# Patient Record
Sex: MALE | Race: WHITE | URBAN - METROPOLITAN AREA
[De-identification: names, ages, dates, MRNs, and addresses within clinical notes are randomized per-mention and may not be internally consistent; named-entity substitution may affect disease eponyms.]

---

## 2021-04-26 ENCOUNTER — OFFICE VISIT (OUTPATIENT)
Dept: ENDOCRINOLOGY | Age: 46
End: 2021-04-26
Payer: COMMERCIAL

## 2021-04-26 VITALS
RESPIRATION RATE: 18 BRPM | OXYGEN SATURATION: 97 % | BODY MASS INDEX: 20.89 KG/M2 | WEIGHT: 162.8 LBS | HEIGHT: 74 IN | DIASTOLIC BLOOD PRESSURE: 93 MMHG | HEART RATE: 96 BPM | SYSTOLIC BLOOD PRESSURE: 142 MMHG

## 2021-04-26 DIAGNOSIS — Z96.41 INSULIN PUMP IN PLACE: ICD-10-CM

## 2021-04-26 DIAGNOSIS — E10.65 UNCONTROLLED TYPE 1 DIABETES MELLITUS WITH HYPERGLYCEMIA (HCC): Primary | ICD-10-CM

## 2021-04-26 LAB — HBA1C MFR BLD: 7.1 %

## 2021-04-26 PROCEDURE — 3051F HG A1C>EQUAL 7.0%<8.0%: CPT | Performed by: INTERNAL MEDICINE

## 2021-04-26 PROCEDURE — 99204 OFFICE O/P NEW MOD 45 MIN: CPT | Performed by: INTERNAL MEDICINE

## 2021-04-26 PROCEDURE — 83036 HEMOGLOBIN GLYCOSYLATED A1C: CPT | Performed by: INTERNAL MEDICINE

## 2021-04-26 PROCEDURE — 95251 CONT GLUC MNTR ANALYSIS I&R: CPT | Performed by: INTERNAL MEDICINE

## 2021-04-26 RX ORDER — ATORVASTATIN CALCIUM 10 MG/1
TABLET, FILM COATED ORAL
COMMUNITY
Start: 2021-03-24

## 2021-04-26 RX ORDER — ENALAPRIL MALEATE 20 MG/1
TABLET ORAL
COMMUNITY
Start: 2021-02-01

## 2021-04-26 SDOH — HEALTH STABILITY: MENTAL HEALTH: HOW OFTEN DO YOU HAVE A DRINK CONTAINING ALCOHOL?: NEVER

## 2021-04-26 NOTE — PROGRESS NOTES
Seen as new patient for diabetes    Diagnosed with Type 1 diabetes mellitus in  1985    Known diabetic complications: retinopathy ( macular edema)  Uncontrolled, moderate    Current diabetic medications     He is on medtronic pump-  On it for 14-16 years     MN 1.10  3:30 1.5  7:30 0.750  13:30 0.550    I:C  MN 13  5 pm 11    CF 60    Target     He has had replacement pump as had failures    Last A1c  7.1%<----- 7.3%<--- 8%<---- 8.5%<---- 9.3%    Prior visit with dietician: Yes   Current diet: on average, 3 meals per day   Current exercise: walking   Current monitoring regimen: home blood tests -   Using CGM    Has brought blood glucose log/meter:   Home blood sugar records:  Download:  Average sensor 149  automode 83 %      Any episodes of hypoglycemia? Worsened by high CHO    No Hx of CAD , PVD, CVA    Hyperlipidemia:   LDL 79 on 3.21    On lipitor for prevention    Last eye exam: 1/21  Last foot exam: 4/21  Last microalbumin to creatinine ratio: 3/21    He is on vasotec 20mg for prevention    No past medical history on file. No past surgical history on file. No current outpatient medications on file. No current facility-administered medications for this visit. Current Outpatient Medications   Medication Sig Dispense Refill    NOVOLOG 100 UNIT/ML injection vial INJECT 50 UNITS DAILY WITH INSULIN PUMP      enalapril (VASOTEC) 20 MG tablet TAKE 1 TABLET BY MOUTH EVERY DAY      atorvastatin (LIPITOR) 10 MG tablet        No current facility-administered medications for this visit.         SH: former smoker    Review of Systems  Please see scanned document dated and signed      Objective:     Vitals:    04/26/21 1619   BP: (!) 142/93   Pulse: 96   Resp: 18   SpO2: 97%       Constitutional: Well-developed, alert, appears stated age, cooperative, in no acute distress  H/E/N/M/T:atraumatic, normocephalic, external ears, nose, lips normal without lesions  No facial puffiness, no hoarseness Eyes: Arcus Senilis is not present, extraocular muscles are intact  Neck: supple, trachea midline, acanthosis nigricance is not present. Thyroid: gland size is normal, non-tender on palpation  Respiratory: breathing is unlabored, lungs are clear to auscultations. Cardiovascular: regular rate and rhythm, S1, S2, regular rate and rhythm, no murmur, rub or gallop. Skeletal muscular: no kyphosis, no gross abnormalities  Skin: Xanthoma/Xanthelasmas are  not present  Psychiatric: Judgement and Insight:  judgement and insight appear normal  Neuro: Normal without focal findings, speech is spontaneous, and movements are coordinated, alert and oriented x3   Skeletal foot exam is normal, no skin lesions, toenails are normal, pulses are normal, 10 g monofilament is     Lab Reviewed   No components found for: CHLPL  No results found for: TRIG  No results found for: HDL  No results found for: LDLCALC  No results found for: LABVLDL  No results found for: LABA1C    Assessment:     Trung Huynh is a 54 y.o. male with :    1.T1DM: Fairly controlled, brittle diabetes, with glucose variation. Reviewed download , he has been placing less CHO with lunch due to post meal lows, will adjust I:C. He does have post supper high, may decrease the I:C  He has lows with activity, advised temporary basal when active. He has some bubbles in reservoir, will have Rep assess. Pump will be out of warranty by next visit and will like to discuss other pump options. 2.Insulin pump in place: On 12 G with automode       Plan:      MN 1.10  3:30 1.5  7:30 0.750  13:30 0.550    I:C  MN 13  11 am 13---> 16  5 pm 11    CF 60    Target      Advised to check blood sugar 4 times a day   Patient to send blood sugar log for titration. Advise to low simple carbohydrate and protein with each  meal diet. Diabetes Care: recommend yearly eye exam, foot exam and urine microalbumin to   creatinine ratio.

## 2021-07-27 ENCOUNTER — OFFICE VISIT (OUTPATIENT)
Dept: ENDOCRINOLOGY | Age: 46
End: 2021-07-27
Payer: COMMERCIAL

## 2021-07-27 VITALS
WEIGHT: 158 LBS | HEART RATE: 78 BPM | SYSTOLIC BLOOD PRESSURE: 120 MMHG | OXYGEN SATURATION: 98 % | DIASTOLIC BLOOD PRESSURE: 78 MMHG | BODY MASS INDEX: 20.28 KG/M2 | HEIGHT: 74 IN

## 2021-07-27 DIAGNOSIS — E10.65 UNCONTROLLED TYPE 1 DIABETES MELLITUS WITH HYPERGLYCEMIA (HCC): Primary | ICD-10-CM

## 2021-07-27 LAB — HBA1C MFR BLD: 7.1 %

## 2021-07-27 PROCEDURE — 99214 OFFICE O/P EST MOD 30 MIN: CPT | Performed by: INTERNAL MEDICINE

## 2021-07-27 PROCEDURE — 83036 HEMOGLOBIN GLYCOSYLATED A1C: CPT | Performed by: INTERNAL MEDICINE

## 2021-07-27 PROCEDURE — 3051F HG A1C>EQUAL 7.0%<8.0%: CPT | Performed by: INTERNAL MEDICINE

## 2021-07-27 PROCEDURE — 95251 CONT GLUC MNTR ANALYSIS I&R: CPT | Performed by: INTERNAL MEDICINE

## 2021-07-27 RX ORDER — ASPIRIN 81 MG
1 TABLET, DELAYED RELEASE (ENTERIC COATED) ORAL DAILY
COMMUNITY

## 2021-07-27 NOTE — LETTER
[facility]) [facility]  [address]  Phone: [phone]  Fax: [phone]    Ronan Osorio MD        July 27, 2021     Patient: Jyotsna Collier   YOB: 1975   Date of Visit: 7/27/2021       To Whom It May Concern:     I saw Mr. Sweta Garcia in clinic today for DM. If you have any questions or concerns, please don't hesitate to call.     Sincerely,          Ronan Osorio MD

## 2021-11-15 ENCOUNTER — TELEPHONE (OUTPATIENT)
Dept: ENDOCRINOLOGY | Age: 46
End: 2021-11-15

## 2021-11-15 NOTE — TELEPHONE ENCOUNTER
[facility] called stating they need office notes request that was faxed on 11/09/2021 and needs this completed asap. Was this received.      Call back [phone]

## 2021-11-22 ENCOUNTER — OFFICE VISIT (OUTPATIENT)
Dept: ENDOCRINOLOGY | Age: 46
End: 2021-11-22
Payer: COMMERCIAL

## 2021-11-22 VITALS
SYSTOLIC BLOOD PRESSURE: 136 MMHG | HEART RATE: 89 BPM | WEIGHT: 161.4 LBS | DIASTOLIC BLOOD PRESSURE: 84 MMHG | BODY MASS INDEX: 21.39 KG/M2 | HEIGHT: 73 IN

## 2021-11-22 DIAGNOSIS — E10.9 DIABETES MELLITUS TYPE 1, CONTROLLED, WITHOUT COMPLICATIONS (HCC): Primary | ICD-10-CM

## 2021-11-22 DIAGNOSIS — Z96.41 INSULIN PUMP IN PLACE: ICD-10-CM

## 2021-11-22 PROCEDURE — 95251 CONT GLUC MNTR ANALYSIS I&R: CPT | Performed by: INTERNAL MEDICINE

## 2021-11-22 PROCEDURE — 99214 OFFICE O/P EST MOD 30 MIN: CPT | Performed by: INTERNAL MEDICINE

## 2021-11-22 PROCEDURE — 3051F HG A1C>EQUAL 7.0%<8.0%: CPT | Performed by: INTERNAL MEDICINE

## 2021-11-22 NOTE — PROGRESS NOTES
(VASOTEC) 20 MG tablet TAKE 1 TABLET BY MOUTH EVERY DAY      atorvastatin (LIPITOR) 10 MG tablet        No current facility-administered medications for this visit. SH: former smoker    Review of Systems  Please see scanned document dated and signed      Objective:     Vitals:    11/22/21 1627   BP: 136/84   Pulse: 89       Constitutional: Well-developed, appears stated age, cooperative, in no acute distress  H/E/N/M/T:atraumatic, normocephalic, external ears, nose, lips normal without lesions  Eyes: Lids, lashes, conjunctivae and sclerae normal, No proptosis, no redness  Neck: supple, symmetrical, no swelling  Skin: No obvious rashes or lesions present. Skin and hair texture normal  Psychiatric: Judgement and Insight:  judgement and insight appear normal  Neuro: Normal without focal findings, speech is normal normal, speech is spontaneous  Chest: No labored breathing, no chest deformity, no stridor  Musculoskeletal: No joint deformity, swelling      Lab Reviewed   No components found for: CHLPL  No results found for: TRIG  No results found for: HDL  No results found for: LDLCALC  No results found for: LABVLDL  Lab Results   Component Value Date    LABA1C 7.1 07/27/2021       Assessment:     Rosie Chery is a 51 y.o. male with :    1.T1DM: Fairly controlled, brittle diabetes, with glucose variation. Reviewed download ,  Post lunch lows are less. Now has high post lunch , change  I:C. He does have post supper high,  decrease the I:C  Fasting high, adjust basal rate  Once he gets transmitter will start back automode  He has lows with activity, advised temporary basal when active. Pump out of warranty, inquiring about other pump options if cheaper, discussed other pumps and can check with Rep about cost, decided to continue with current pump. 2.Insulin pump in place: On 670 G with automode    3. Retinopathy    Plan:      MN 1.10---> 1.20  3:30 1.5  7:30 0.750  13:30 0.550    I:C  MN 13  11 am  16---> 15  5 pm 11--->10    CF 60    [facility]      Advised to check blood sugar 4 times a day   Patient to send blood sugar log for titration. Advise to low simple carbohydrate and protein with each  meal diet. Diabetes Care: recommend yearly eye exam, foot exam and urine microalbumin to   creatinine ratio.

## 2022-02-22 ENCOUNTER — OFFICE VISIT (OUTPATIENT)
Dept: ENDOCRINOLOGY | Age: 47
End: 2022-02-22
Payer: COMMERCIAL

## 2022-02-22 VITALS
WEIGHT: 163.6 LBS | DIASTOLIC BLOOD PRESSURE: 76 MMHG | BODY MASS INDEX: 21.68 KG/M2 | OXYGEN SATURATION: 98 % | SYSTOLIC BLOOD PRESSURE: 122 MMHG | HEART RATE: 90 BPM | HEIGHT: 73 IN

## 2022-02-22 DIAGNOSIS — E10.9 DIABETES MELLITUS TYPE 1, CONTROLLED, WITHOUT COMPLICATIONS (HCC): Primary | ICD-10-CM

## 2022-02-22 DIAGNOSIS — Z96.41 INSULIN PUMP IN PLACE: ICD-10-CM

## 2022-02-22 LAB — HBA1C MFR BLD: 7.1 %

## 2022-02-22 PROCEDURE — 99214 OFFICE O/P EST MOD 30 MIN: CPT | Performed by: INTERNAL MEDICINE

## 2022-02-22 PROCEDURE — 95251 CONT GLUC MNTR ANALYSIS I&R: CPT | Performed by: INTERNAL MEDICINE

## 2022-02-22 PROCEDURE — 3051F HG A1C>EQUAL 7.0%<8.0%: CPT | Performed by: INTERNAL MEDICINE

## 2022-02-22 PROCEDURE — 83036 HEMOGLOBIN GLYCOSYLATED A1C: CPT | Performed by: INTERNAL MEDICINE

## 2022-02-22 NOTE — PROGRESS NOTES
injection vial INJECT 50 UNITS DAILY WITH INSULIN PUMP      enalapril (VASOTEC) 20 MG tablet TAKE 1 TABLET BY MOUTH EVERY DAY      atorvastatin (LIPITOR) 10 MG tablet        No current facility-administered medications for this visit. SH: former smoker    Review of Systems  Please see scanned document dated and signed      Objective:     Vitals:    02/22/22 1656   BP: 122/76   Pulse:    SpO2:        Constitutional: Well-developed, appears stated age, cooperative, in no acute distress  H/E/N/M/T:atraumatic, normocephalic, external ears, nose, lips normal without lesions  Eyes: Lids, lashes, conjunctivae and sclerae normal, No proptosis, no redness  Neck: supple, symmetrical, no swelling  Skin: No obvious rashes or lesions present. Skin and hair texture normal  Psychiatric: Judgement and Insight:  judgement and insight appear normal  Neuro: Normal without focal findings, speech is normal normal, speech is spontaneous  Chest: No labored breathing, no chest deformity, no stridor  Musculoskeletal: No joint deformity, swelling      Lab Reviewed   No components found for: CHLPL  No results found for: TRIG  No results found for: HDL  No results found for: LDLCALC  No results found for: LABVLDL  Lab Results   Component Value Date    LABA1C 7.1 07/27/2021       Assessment:     Ellen Mott is a 51 y.o. male with :    1.T1DM: Fairly controlled, brittle diabetes, with glucose variation. Reviewed download ,good control. Occasional low with rebound highs. Advised to use glucose tablet or gel or regular soda 15 gram for correction instead of candies. A1c better, continue same rate. Will also change alert to 80 mg/dl  He has lows with activity, advised temporary basal when active. He is now on 770 G    2. Insulin pump in place: On 670 G with automode    3. Retinopathy    Plan:      MN 1.20  3:30 1.5  7:30 0.750  13:30 0.550    I:C  MN 13  11 am  15  5 pm 10    CF 60    Target      Advised to check blood sugar 4 times a day   Patient to send blood sugar log for titration. Advise to low simple carbohydrate and protein with each  meal diet. Diabetes Care: recommend yearly eye exam, foot exam and urine microalbumin to   creatinine ratio.

## 2022-02-22 NOTE — LETTER
[facility]) [facility]  [address] [place] [address]  Phone: [phone]  Fax: [phone]    Tory Rich MD        February 22, 2022     Patient: Marjorie Chiang   YOB: 1975   Date of Visit: 2/22/2022       To Whom It May Concern:    Andrea Orta was seen in my office on 02/22/2022 and may return to work on 02/23/2022    If you have any questions or concerns, please don't hesitate to call.     Sincerely,        Tory Rich MD

## 2022-02-24 ENCOUNTER — TELEPHONE (OUTPATIENT)
Dept: ENDOCRINOLOGY | Age: 47
End: 2022-02-24

## 2022-08-01 ENCOUNTER — OFFICE VISIT (OUTPATIENT)
Dept: ENDOCRINOLOGY | Age: 47
End: 2022-08-01
Payer: COMMERCIAL

## 2022-08-01 VITALS
OXYGEN SATURATION: 96 % | WEIGHT: 164 LBS | DIASTOLIC BLOOD PRESSURE: 75 MMHG | HEIGHT: 73 IN | BODY MASS INDEX: 21.74 KG/M2 | SYSTOLIC BLOOD PRESSURE: 123 MMHG | HEART RATE: 87 BPM

## 2022-08-01 DIAGNOSIS — E10.9 DIABETES MELLITUS TYPE 1, CONTROLLED, WITHOUT COMPLICATIONS (HCC): Primary | ICD-10-CM

## 2022-08-01 PROBLEM — E10.39 DM (DIABETES MELLITUS) TYPE I CONTROLLED WITH EYE MANIFESTATION (HCC): Status: ACTIVE | Noted: 2020-07-01

## 2022-08-01 LAB — HBA1C MFR BLD: 6.8 %

## 2022-08-01 PROCEDURE — 3044F HG A1C LEVEL LT 7.0%: CPT | Performed by: INTERNAL MEDICINE

## 2022-08-01 PROCEDURE — 95251 CONT GLUC MNTR ANALYSIS I&R: CPT | Performed by: INTERNAL MEDICINE

## 2022-08-01 PROCEDURE — 99214 OFFICE O/P EST MOD 30 MIN: CPT | Performed by: INTERNAL MEDICINE

## 2022-08-01 PROCEDURE — 83036 HEMOGLOBIN GLYCOSYLATED A1C: CPT | Performed by: INTERNAL MEDICINE

## 2022-08-01 RX ORDER — INSULIN ASPART 100 [IU]/ML
INJECTION, SOLUTION INTRAVENOUS; SUBCUTANEOUS
Qty: 50 ML | Refills: 3 | Status: SHIPPED | OUTPATIENT
Start: 2022-08-01

## 2022-08-01 NOTE — PROGRESS NOTES
Bruno as  patient for diabetes    Interim:    He is using sensor now and in automode  He has 770 G  He was on vacation  Glucose higher    Lows less with activity  Hard to correct a low, takes candies    Diagnosed with Type 1 diabetes mellitus in  1985    Known diabetic complications: retinopathy ( macular edema)he has injections Sees Dr. Dixie Wilson  Uncontrolled, moderate    Current diabetic medications     He is on medtronic pump-  On it for 14-16 years      MN  1.20  3:30 1.5  7:30 0.750  13:30 0.550    I:C  MN 13  11 am   15  5 pm 10    CF 60    [facility]     He has had replacement pump as had failures    Last A1c  6.8%<-- 7.1%,----- 6.9%<----  7.1%<----7.1%<----- 7.3%<--- 8%<---- 8.5%<---- 9.3%    Prior visit with dietician: Yes   Current diet: on average, 3 meals per day   Current exercise: walking   Current monitoring regimen: home blood tests -   Using CGM    Has brought blood glucose log/meter:   Home blood sugar records:  Download:  Average sensor 159  automode 78%    Post lunch and dinner highs    Any episodes of hypoglycemia? Worsened by high CHO    No Hx of CAD , PVD, CVA    Hyperlipidemia:   LDL 79 on 3.21    On lipitor for prevention    Last eye exam: 1/22  Last foot exam: 4/21  Last microalbumin to creatinine ratio: 4/22    He is on vasotec 20mg for prevention    Labs by PCP 3/21    No past medical history on file. No past surgical history on file. Current Outpatient Medications   Medication Sig Dispense Refill    Multiple Vitamins-Minerals (MULTIVITAMIN-MINERALS) TABS tablet Take 1 tablet by mouth daily      NOVOLOG 100 UNIT/ML injection vial INJECT 50 UNITS DAILY WITH INSULIN PUMP      enalapril (VASOTEC) 20 MG tablet TAKE 1 TABLET BY MOUTH EVERY DAY      atorvastatin (LIPITOR) 10 MG tablet        No current facility-administered medications for this visit.      Current Outpatient Medications   Medication Sig Dispense Refill    Multiple Vitamins-Minerals (MULTIVITAMIN-MINERALS) TABS tablet Take 1 tablet by mouth daily      NOVOLOG 100 UNIT/ML injection vial INJECT 50 UNITS DAILY WITH INSULIN PUMP      enalapril (VASOTEC) 20 MG tablet TAKE 1 TABLET BY MOUTH EVERY DAY      atorvastatin (LIPITOR) 10 MG tablet        No current facility-administered medications for this visit. SH: former smoker    Review of Systems  Please see scanned document dated and signed      Objective: There were no vitals filed for this visit. Constitutional: Well-developed, appears stated age, cooperative, in no acute distress  H/E/N/M/T:atraumatic, normocephalic, external ears, nose, lips normal without lesions  Eyes: Lids, lashes, conjunctivae and sclerae normal, No proptosis, no redness  Neck: supple, symmetrical, no swelling  Skin: No obvious rashes or lesions present. Skin and hair texture normal  Psychiatric: Judgement and Insight:  judgement and insight appear normal  Neuro: Normal without focal findings, speech is normal normal, speech is spontaneous  Chest: No labored breathing, no chest deformity, no stridor  Musculoskeletal: No joint deformity, swelling      Lab Reviewed   No components found for: CHLPL  No results found for: TRIG  No results found for: HDL  No results found for: LDLCALC  No results found for: LABVLDL  Lab Results   Component Value Date    LABA1C 7.1 02/22/2022       Assessment:     Telly Perez is a 51 y.o. male with :    1.T1DM: Fairly controlled, brittle diabetes, with glucose variation. Reviewed download ,good control. Post lunch and dinner highs, previously Occasional low with rebound highs. Advised to use glucose tablet or gel or regular soda 15 gram for correction instead of candies. A1c better, change I:C    He has lows with activity, advised temporary basal when active. He is now on 770 G  Goal A1c  <7.5%  2. Insulin pump in place: On 670 G with automode    3. Retinopathy    Plan:      MN 1.20  3:30 1.5  7:30 0.750  13:30 0.550    I:C  MN 13  11 am  15---> 14  5 pm 10---> 9.5    CF 60    Target      Advised to check blood sugar 4 times a day   Patient to send blood sugar log for titration. Advise to low simple carbohydrate and protein with each  meal diet. Diabetes Care: recommend yearly eye exam, foot exam and urine microalbumin to   creatinine ratio.

## 2022-08-01 NOTE — LETTER
[facility]) [facility]  [address]  Phone: [phone]  Fax: [phone]    Ciara Pham MD        August 1, 2022     Patient: Drew Bailey   YOB: 1975   Date of Visit: 8/1/2022       To Whom It May Concern:       Suyapa Pompa was seen in our office 8/1/2022    If you have any questions or concerns, please don't hesitate to call.     Sincerely,        Ciara Pham MD

## 2022-11-07 ENCOUNTER — OFFICE VISIT (OUTPATIENT)
Dept: ENDOCRINOLOGY | Age: 47
End: 2022-11-07
Payer: COMMERCIAL

## 2022-11-07 VITALS
TEMPERATURE: 98 F | HEART RATE: 85 BPM | HEIGHT: 73 IN | WEIGHT: 164 LBS | BODY MASS INDEX: 21.74 KG/M2 | DIASTOLIC BLOOD PRESSURE: 78 MMHG | SYSTOLIC BLOOD PRESSURE: 120 MMHG | RESPIRATION RATE: 14 BRPM

## 2022-11-07 DIAGNOSIS — E10.65 UNCONTROLLED TYPE 1 DIABETES MELLITUS WITH HYPERGLYCEMIA (HCC): Primary | ICD-10-CM

## 2022-11-07 LAB — HBA1C MFR BLD: 6.8 %

## 2022-11-07 PROCEDURE — 83036 HEMOGLOBIN GLYCOSYLATED A1C: CPT | Performed by: INTERNAL MEDICINE

## 2022-11-07 PROCEDURE — 3044F HG A1C LEVEL LT 7.0%: CPT | Performed by: INTERNAL MEDICINE

## 2022-11-07 PROCEDURE — 3074F SYST BP LT 130 MM HG: CPT | Performed by: INTERNAL MEDICINE

## 2022-11-07 PROCEDURE — 99214 OFFICE O/P EST MOD 30 MIN: CPT | Performed by: INTERNAL MEDICINE

## 2022-11-07 PROCEDURE — 95251 CONT GLUC MNTR ANALYSIS I&R: CPT | Performed by: INTERNAL MEDICINE

## 2022-11-07 PROCEDURE — 3078F DIAST BP <80 MM HG: CPT | Performed by: INTERNAL MEDICINE

## 2022-11-07 NOTE — LETTER
[facility]) [facility]  [address]  Phone: [phone]  Fax: [phone]    Ramakrishna Schmitt MD        November 7, 2022     Patient: Latoya Love   YOB: 1975   Date of Visit: 11/7/2022       To Whom it May Concern:    Lb Schmitz was seen in my clinic on 11/7/2022. If you have any questions or concerns, please don't hesitate to call.     Sincerely,         Ramakrishna Schmitt MD

## 2022-11-07 NOTE — PROGRESS NOTES
Bruno as  patient for diabetes    Interim:    He is using sensor now and in automode  He has 770 G  Lows at 3-4 pm    Lows less with activity  Hard to correct a low, takes candies    Diagnosed with Type 1 diabetes mellitus in  1985    Known diabetic complications: retinopathy ( macular edema)he has injections Sees Dr. Jimmy Mancilla  Uncontrolled, moderate    Current diabetic medications     He is on medtronic pump-  On it for 14-16 years      MN  1.20  3:30 1.5  7:30 0.750  13:30 0.550    I:C  MN 13  11 am   15---> 14  5 pm 10---> 9.5    CF 60    [facility]     He has had replacement pump as had failures    Last A1c 6.8%<---- 6.8%<-- 7.1%<----- 6.9%<----  7.1%<----7.1%<----- 7.3%<--- 8%<---- 8.5%<---- 9.3%    Prior visit with dietician: Yes   Current diet: on average, 3 meals per day   Current exercise: walking   Current monitoring regimen: home blood tests -   Using CGM    Has brought blood glucose log/meter:   Home blood sugar records:    Download: Oct 22-Nov 7  Average sensor 148  automode  89%  CV 36.9%    Post lunch and dinner highs    Any episodes of hypoglycemia? Worsened by high CHO    No Hx of CAD , PVD, CVA    Hyperlipidemia:   LDL 79 on 3.21    On lipitor for prevention    Last eye exam: 1/22  Last foot exam: 11/22  Last microalbumin to creatinine ratio: 4/22    He is on vasotec 20mg for prevention    Labs by PCP 3/21    History reviewed. No pertinent past medical history. No past surgical history on file.   Current Outpatient Medications   Medication Sig Dispense Refill    vitamin D (CHOLECALCIFEROL) 25 MCG (1000 UT) TABS tablet Take 1,000 Units by mouth daily      Ascorbic Acid 500 MG CHEW Take 500 mg by mouth daily      insulin aspart (NOVOLOG) 100 UNIT/ML injection vial 50 units daily via pump 50 mL 3    Multiple Vitamins-Minerals (MULTIVITAMIN-MINERALS) TABS tablet Take 1 tablet by mouth daily      enalapril (VASOTEC) 20 MG tablet TAKE 1 TABLET BY MOUTH EVERY DAY      atorvastatin (LIPITOR) 10 MG tablet        No current facility-administered medications for this visit. Current Outpatient Medications   Medication Sig Dispense Refill    vitamin D (CHOLECALCIFEROL) 25 MCG (1000 UT) TABS tablet Take 1,000 Units by mouth daily      Ascorbic Acid 500 MG CHEW Take 500 mg by mouth daily      insulin aspart (NOVOLOG) 100 UNIT/ML injection vial 50 units daily via pump 50 mL 3    Multiple Vitamins-Minerals (MULTIVITAMIN-MINERALS) TABS tablet Take 1 tablet by mouth daily      enalapril (VASOTEC) 20 MG tablet TAKE 1 TABLET BY MOUTH EVERY DAY      atorvastatin (LIPITOR) 10 MG tablet        No current facility-administered medications for this visit. SH: former smoker    Review of Systems  Please see scanned document dated and signed      Objective:     Vitals:    11/07/22 1645   BP: 120/78   Pulse: 85   Resp: 14   Temp: 98 °F (36.7 °C)         Constitutional: Well-developed, appears stated age, cooperative, in no acute distress  H/E/N/M/T:atraumatic, normocephalic, external ears, nose, lips normal without lesions  Eyes: Lids, lashes, conjunctivae and sclerae normal, No proptosis, no redness  Neck: supple, symmetrical, no swelling  Skin: No obvious rashes or lesions present. Skin and hair texture normal  Psychiatric: Judgement and Insight:  judgement and insight appear normal  Neuro: Normal without focal findings, speech is normal normal, speech is spontaneous  Chest: No labored breathing, no chest deformity, no stridor  Musculoskeletal: No joint deformity, swelling    11/22 monofilament detected  No ulcers    Lab Reviewed   No components found for: CHLPL  No results found for: TRIG  No results found for: HDL  No results found for: LDLCALC  No results found for: LABVLDL  Lab Results   Component Value Date    LABA1C 6.8 08/01/2022       Assessment:     Mookie Sifuentes is a 49 y.o. male with :    1.T1DM: Fairly controlled, brittle diabetes, with glucose variation. Reviewed download ,good control.  Reports some afternoon lows, will change I:C Advised to use glucose tablet or gel or regular soda 15 gram for correction instead of candies. A1c better  He has lows with activity, advised temporary basal when active. He is now on 770 G  Goal A1c  <7.5%    2. Insulin pump in place: On 670 G with automode    3. Retinopathy    Plan:      MN 1.20  3:30 1.5  7:30 0.750  13:30 0.550    I:C  MN 13  11 am   15  5 pm 9.5    CF 60    [facility]      Advised to check blood sugar 4 times a day   Patient to send blood sugar log for titration. Advise to low simple carbohydrate and protein with each  meal diet. Diabetes Care: recommend yearly eye exam, foot exam and urine microalbumin to   creatinine ratio.

## 2023-01-03 ENCOUNTER — TELEPHONE (OUTPATIENT)
Dept: ENDOCRINOLOGY | Age: 48
End: 2023-01-03

## 2023-01-03 NOTE — TELEPHONE ENCOUNTER
Call from [employer] stating they sent over info through \"order exchange\".  Asked [employer] if they could send it via fax also and they state they would like to talk to a manager that they have it set up the best method is to send it through \"order exchange\"    # [employer] #[phone] option 2

## 2023-02-20 ENCOUNTER — OFFICE VISIT (OUTPATIENT)
Dept: ENDOCRINOLOGY | Age: 48
End: 2023-02-20
Payer: COMMERCIAL

## 2023-02-20 VITALS — WEIGHT: 167 LBS | BODY MASS INDEX: 22.03 KG/M2

## 2023-02-20 DIAGNOSIS — Z96.41 INSULIN PUMP IN PLACE: ICD-10-CM

## 2023-02-20 DIAGNOSIS — E10.65 UNCONTROLLED TYPE 1 DIABETES MELLITUS WITH HYPERGLYCEMIA (HCC): Primary | ICD-10-CM

## 2023-02-20 PROCEDURE — 95251 CONT GLUC MNTR ANALYSIS I&R: CPT | Performed by: INTERNAL MEDICINE

## 2023-02-20 PROCEDURE — 99214 OFFICE O/P EST MOD 30 MIN: CPT | Performed by: INTERNAL MEDICINE

## 2023-02-20 NOTE — PROGRESS NOTES
Seen as  patient for diabetes    Interim:    He is using sensor and in automode  He has 770 G    He was on steroids for 2 weeks, did temporary basal    Lows less with activity  Hard to correct a low, takes candies    Diagnosed with Type 1 diabetes mellitus in  1985    Known diabetic complications: retinopathy ( macular edema)he has injections Sees Dr. Arcelia Heimlich  Uncontrolled, moderate    Current diabetic medications     He is on medtronic pump-  On it for 14-16 years      MN  1.20  3:30 1.5  7:30 0.750  13:30 0.550    I:C  MN 13  11 am    14  5 pm 9.5    CF 60    Target     He has had replacement pump as had failures    Last A1c 7.1%<-----6.8%<---- 6.8%<-- 7.1%<----- 6.9%<----  7.1%<----7.1%<----- 7.3%<--- 8%<---- 8.5%<---- 9.3%    Prior visit with dietician: Yes   Current diet: on average, 3 meals per day   Current exercise: walking   Current monitoring regimen: home blood tests -   Using CGM    Has brought blood glucose log/meter:   Home blood sugar records:    Last 2 weeks  Average sensor 152  automode 92  CV 33.3%  103-293  Some post dinner and late snack high  No significant lows    Any episodes of hypoglycemia? Worsened by high CHO    No Hx of CAD , PVD, CVA    Hyperlipidemia:   LDL 79 on 3.21    On lipitor for prevention    Last eye exam: 1/22  Last foot exam: 11/22  Last microalbumin to creatinine ratio: 4/22    He is on vasotec 20mg for prevention    Labs by PCP 3/21    History reviewed. No pertinent past medical history. No past surgical history on file.   Current Outpatient Medications   Medication Sig Dispense Refill    vitamin D (CHOLECALCIFEROL) 25 MCG (1000 UT) TABS tablet Take 1,000 Units by mouth daily      Ascorbic Acid 500 MG CHEW Take 500 mg by mouth daily      insulin aspart (NOVOLOG) 100 UNIT/ML injection vial 50 units daily via pump 50 mL 3    Multiple Vitamins-Minerals (MULTIVITAMIN-MINERALS) TABS tablet Take 1 tablet by mouth daily      enalapril (VASOTEC) 20 MG tablet TAKE 1 TABLET BY MOUTH EVERY DAY      atorvastatin (LIPITOR) 10 MG tablet        No current facility-administered medications for this visit. Current Outpatient Medications   Medication Sig Dispense Refill    vitamin D (CHOLECALCIFEROL) 25 MCG (1000 UT) TABS tablet Take 1,000 Units by mouth daily      Ascorbic Acid 500 MG CHEW Take 500 mg by mouth daily      insulin aspart (NOVOLOG) 100 UNIT/ML injection vial 50 units daily via pump 50 mL 3    Multiple Vitamins-Minerals (MULTIVITAMIN-MINERALS) TABS tablet Take 1 tablet by mouth daily      enalapril (VASOTEC) 20 MG tablet TAKE 1 TABLET BY MOUTH EVERY DAY      atorvastatin (LIPITOR) 10 MG tablet        No current facility-administered medications for this visit. SH: former smoker    Review of Systems  Please see scanned document dated and signed      Objective: There were no vitals filed for this visit. Constitutional: Well-developed, appears stated age, cooperative, in no acute distress  H/E/N/M/T:atraumatic, normocephalic, external ears, nose, lips normal without lesions  Eyes: Lids, lashes, conjunctivae and sclerae normal, No proptosis, no redness  Neck: supple, symmetrical, no swelling  Skin: No obvious rashes or lesions present. Skin and hair texture normal  Psychiatric: Judgement and Insight:  judgement and insight appear normal  Neuro: Normal without focal findings, speech is normal normal, speech is spontaneous  Chest: No labored breathing, no chest deformity, no stridor  Musculoskeletal: No joint deformity, swelling    11/22 monofilament detected  No ulcers    Lab Reviewed   No components found for: CHLPL  No results found for: TRIG  No results found for: HDL  No results found for: LDLCALC  No results found for: LABVLDL  Lab Results   Component Value Date    LABA1C 6.8 11/07/2022       Assessment:     Shefali Oropeza is a 49 y.o. male with :    1.T1DM: Fairly controlled, brittle diabetes, with glucose variation. Reviewed download ,good control.    He has some late evening high, takes upto 70-90 gram of CHO with dinner or late snack, advised to decrease. Advised to use glucose tablet or gel or regular soda 15 gram for correction instead of candies. A1c increased but was on steroids. He has lows with activity, advised temporary basal when active. He is now on 770 G  Goal A1c  <7.5%    2. Insulin pump in place: On 670 G with automode    3. Retinopathy    Plan:      MN 1.20  3:30 1.5  7:30 0.750  13:30 0.550    I:C  MN 13  11 am   14  5 pm 9.5    CF 60    Target      Advised to check blood sugar 4 times a day   Patient to send blood sugar log for titration. Advise to low simple carbohydrate and protein with each  meal diet. Diabetes Care: recommend yearly eye exam, foot exam and urine microalbumin to   creatinine ratio.

## 2023-06-19 ENCOUNTER — OFFICE VISIT (OUTPATIENT)
Dept: ENDOCRINOLOGY | Age: 48
End: 2023-06-19
Payer: COMMERCIAL

## 2023-06-19 VITALS
BODY MASS INDEX: 22.26 KG/M2 | SYSTOLIC BLOOD PRESSURE: 131 MMHG | DIASTOLIC BLOOD PRESSURE: 79 MMHG | RESPIRATION RATE: 14 BRPM | HEART RATE: 78 BPM | TEMPERATURE: 98 F | HEIGHT: 73 IN | WEIGHT: 168 LBS | OXYGEN SATURATION: 98 %

## 2023-06-19 DIAGNOSIS — E10.9 DIABETES MELLITUS TYPE 1, CONTROLLED, WITHOUT COMPLICATIONS (HCC): Primary | ICD-10-CM

## 2023-06-19 LAB — HBA1C MFR BLD: 7 %

## 2023-06-19 PROCEDURE — 83037 HB GLYCOSYLATED A1C HOME DEV: CPT | Performed by: INTERNAL MEDICINE

## 2023-06-19 PROCEDURE — 95251 CONT GLUC MNTR ANALYSIS I&R: CPT | Performed by: INTERNAL MEDICINE

## 2023-06-19 PROCEDURE — 3078F DIAST BP <80 MM HG: CPT | Performed by: INTERNAL MEDICINE

## 2023-06-19 PROCEDURE — 99214 OFFICE O/P EST MOD 30 MIN: CPT | Performed by: INTERNAL MEDICINE

## 2023-06-19 PROCEDURE — 3075F SYST BP GE 130 - 139MM HG: CPT | Performed by: INTERNAL MEDICINE

## 2023-06-19 NOTE — PROGRESS NOTES
brittle diabetes, with glucose variation. Reviewed download ,good control. He has some late evening high, takes upto 70-90 gram of CHO with dinner or late snack, advised to decrease. Advised to use glucose tablet or gel or regular soda 15 gram for correction instead of candies. A1c increased but was on steroids. He has lows with activity, advised temporary basal when active. He is now on 770 G  Goal A1c  <7.5%  Stable    2. Insulin pump in place: On 670 G with automode    3. Retinopathy    Plan:      MN 1.20  3:30 1.5  7:30 0.750  13:30 0.550    I:C  MN 13  11 am   15  5 pm 9.5    CF 60    Target      Advised to check blood sugar 4 times a day   Patient to send blood sugar log for titration. Advise to low simple carbohydrate and protein with each  meal diet. Diabetes Care: recommend yearly eye exam, foot exam and urine microalbumin to   creatinine ratio.

## 2023-06-20 LAB
CREAT UR-MCNC: 10.4 MG/DL (ref 39–259)
MICROALBUMIN UR DL<=1MG/L-MCNC: <1.2 MG/DL
MICROALBUMIN/CREAT UR: ABNORMAL MG/G (ref 0–30)

## 2023-09-18 RX ORDER — INSULIN ASPART 100 [IU]/ML
INJECTION, SOLUTION INTRAVENOUS; SUBCUTANEOUS
Qty: 50 ML | Refills: 3 | Status: SHIPPED | OUTPATIENT
Start: 2023-09-18

## 2023-09-18 NOTE — TELEPHONE ENCOUNTER
Medication:   Requested Prescriptions     Pending Prescriptions Disp Refills    insulin aspart (NOVOLOG) 100 UNIT/ML injection vial [Pharmacy Med Name: Stacey Caba 100 UNIT/ML VIAL] 50 mL 3     Si UNITS DAILY VIA PUMP       Last Filled:      Patient Phone Number: [phone] (home) [phone] (work)    Last appt: 2023   Next appt: 10/10/2023    Last Labs DM:   Lab Results   Component Value Date/Time    LABA1C 7.0 2023 04:57 PM 6

## 2023-10-10 ENCOUNTER — OFFICE VISIT (OUTPATIENT)
Dept: ENDOCRINOLOGY | Age: 48
End: 2023-10-10

## 2023-10-10 VITALS
SYSTOLIC BLOOD PRESSURE: 139 MMHG | HEART RATE: 82 BPM | TEMPERATURE: 98 F | WEIGHT: 168 LBS | HEIGHT: 73 IN | DIASTOLIC BLOOD PRESSURE: 87 MMHG | RESPIRATION RATE: 15 BRPM | BODY MASS INDEX: 22.26 KG/M2 | OXYGEN SATURATION: 98 %

## 2023-10-10 DIAGNOSIS — E10.9 DIABETES MELLITUS TYPE 1, CONTROLLED, WITHOUT COMPLICATIONS (HCC): Primary | ICD-10-CM

## 2023-10-10 DIAGNOSIS — Z96.41 INSULIN PUMP IN PLACE: ICD-10-CM

## 2023-10-10 LAB — HBA1C MFR BLD: 7.2 %

## 2023-10-10 RX ORDER — INSULIN ASPART 100 [IU]/ML
INJECTION, SOLUTION INTRAVENOUS; SUBCUTANEOUS
Qty: 50 ML | Refills: 3 | Status: SHIPPED | OUTPATIENT
Start: 2023-10-10

## 2023-10-10 NOTE — PROGRESS NOTES
Seen as  patient for diabetes    Interim:    He is using sensor and in automode  He has 80 G with G4 now  Had to use old pump as previous failedm was off automode at that time    He was on steroids for 2 weeks, did temporary basal    Lows less with activity  Hard to correct a low, takes candies    Diagnosed with Type 1 diabetes mellitus in  1985    Known diabetic complications: retinopathy ( macular edema)he has injections Sees Dr. Ryan Heredia  Uncontrolled, moderate    Current diabetic medications     He is on medtronic pump-  On it for 14-16 years      MN  1.20  3:30 1.5  7:30 0.750  13:30 0.550    I:C  MN 13  11 am    15  5 pm 9.5    CF 60    Target     He has had replacement pump as had failures    Last A1c 7.2%<------7%<---7.1%<-----6.8%<---- 6.8%<-- 7.1%<----- 6.9%<----  7.1%<----7.1%<----- 7.3%<--- 8%<---- 8.5%<---- 9.3%    Prior visit with dietician: Yes   Current diet: on average, 3 meals per day   Current exercise: walking   Current monitoring regimen: home blood tests -   Using CGM    Has brought blood glucose log/meter:   Home blood sugar records:  CGM  Last 2 weeks  Average sensor 147  automode 63%  In range 74%  High 24 %    No significant lows    Any episodes of hypoglycemia? Worsened by high CHO    No Hx of CAD , PVD, CVA    Hyperlipidemia:   LDL 79 on 3.21    On lipitor for prevention    Last eye exam: 5/23  Last foot exam: 11/22  Last microalbumin to creatinine ratio: 6/23    He is on vasotec 20mg for prevention    Labs by PCP 3/21    No past medical history on file. No past surgical history on file.   Current Outpatient Medications   Medication Sig Dispense Refill    insulin aspart (NOVOLOG) 100 UNIT/ML injection vial 50 UNITS DAILY VIA PUMP 50 mL 3    vitamin D (CHOLECALCIFEROL) 25 MCG (1000 UT) TABS tablet Take 1 tablet by mouth daily      Ascorbic Acid 500 MG CHEW Take 500 mg by mouth daily      Multiple Vitamins-Minerals (MULTIVITAMIN-MINERALS) TABS tablet Take 1 tablet by mouth daily

## 2023-10-30 ENCOUNTER — TELEPHONE (OUTPATIENT)
Dept: ENDOCRINOLOGY | Age: 48
End: 2023-10-30

## 2023-10-30 RX ORDER — INSULIN ASPART 100 [IU]/ML
INJECTION, SOLUTION INTRAVENOUS; SUBCUTANEOUS
Qty: 50 ML | Refills: 3 | Status: SHIPPED | OUTPATIENT
Start: 2023-10-30

## 2023-10-30 NOTE — TELEPHONE ENCOUNTER
Submitted PA for Insulin Aspart  Via [facility] Key: [id number] STATUS: PENDING. Follow up done daily; if no response in three days we will refax for status check. If another three days goes by with no response we will call the insurance for status.

## 2024-02-03 LAB
ALBUMIN SERPL-MCNC: 4.4 G/DL (ref 3.5–5.7)
ALP BLD-CCNC: 51 IU/L (ref 35–135)
ALT SERPL-CCNC: 15 IU/L (ref 10–60)
ANION GAP SERPL CALCULATED.3IONS-SCNC: 4 MMOL/L (ref 4–16)
AST SERPL-CCNC: 21 IU/L (ref 10–40)
BILIRUB SERPL-MCNC: 0.7 MG/DL (ref 0–1.2)
BUN BLDV-MCNC: 14 MG/DL (ref 8–26)
CALCIUM SERPL-MCNC: 9.6 MG/DL (ref 8.5–10.4)
CHLORIDE BLD-SCNC: 105 MEQ/L (ref 98–111)
CHOLESTEROL, TOTAL: 143 MG/DL
CO2: 29 MMOL/L (ref 21–31)
CREAT SERPL-MCNC: 1.22 MG/DL (ref 0.7–1.3)
EGFR (CKD-EPI): 73 ML/MIN/1.73 M2
ESTIMATED AVERAGE GLUCOSE: 143 MG/DL
GLUCOSE BLD-MCNC: 112 MG/DL (ref 70–99)
HBA1C MFR BLD: 6.6 % (ref 4.2–5.6)
HCT VFR BLD CALC: 46.3 % (ref 40–50)
HDLC SERPL-MCNC: 64 MG/DL
HEMOGLOBIN: 15.6 G/DL (ref 13.5–16.5)
LDL CHOLESTEROL CALCULATED: 68 MG/DL
MCH RBC QN AUTO: 29 PG (ref 27–33)
MCHC RBC AUTO-ENTMCNC: 33.8 G/DL (ref 32–36)
MCV RBC AUTO: 85.8 FL (ref 82–97)
NONHDLC SERPL-MCNC: 79 MG/DL
PDW BLD-RTO: 12.8 % (ref 12.3–17)
PLATELET # BLD: 314 THOU/MCL (ref 140–375)
PMV BLD AUTO: 7.6 FL (ref 7.4–11.5)
POTASSIUM SERPL-SCNC: 4.9 MEQ/L (ref 3.6–5.1)
RBC # BLD: 5.39 MIL/MCL (ref 4.4–5.8)
SODIUM BLD-SCNC: 138 MEQ/L (ref 135–145)
TOTAL PROTEIN: 7.1 G/DL (ref 6–8)
TRIGL SERPL-MCNC: 54 MG/DL
TSH ULTRASENSITIVE: 1.99 MCIU/ML (ref 0.27–4.2)
WBC # BLD: 6 THOU/MCL (ref 3.6–10.5)

## 2024-02-19 ENCOUNTER — OFFICE VISIT (OUTPATIENT)
Dept: ENDOCRINOLOGY | Age: 49
End: 2024-02-19
Payer: COMMERCIAL

## 2024-02-19 VITALS
RESPIRATION RATE: 14 BRPM | HEIGHT: 73 IN | WEIGHT: 168 LBS | TEMPERATURE: 97 F | OXYGEN SATURATION: 98 % | HEART RATE: 103 BPM | BODY MASS INDEX: 22.26 KG/M2 | SYSTOLIC BLOOD PRESSURE: 132 MMHG | DIASTOLIC BLOOD PRESSURE: 84 MMHG

## 2024-02-19 DIAGNOSIS — Z96.41 INSULIN PUMP IN PLACE: ICD-10-CM

## 2024-02-19 DIAGNOSIS — E10.9 DIABETES MELLITUS TYPE 1, CONTROLLED, WITHOUT COMPLICATIONS (HCC): Primary | ICD-10-CM

## 2024-02-19 PROCEDURE — 3075F SYST BP GE 130 - 139MM HG: CPT | Performed by: INTERNAL MEDICINE

## 2024-02-19 PROCEDURE — 99214 OFFICE O/P EST MOD 30 MIN: CPT | Performed by: INTERNAL MEDICINE

## 2024-02-19 PROCEDURE — 3044F HG A1C LEVEL LT 7.0%: CPT | Performed by: INTERNAL MEDICINE

## 2024-02-19 PROCEDURE — 3079F DIAST BP 80-89 MM HG: CPT | Performed by: INTERNAL MEDICINE

## 2024-02-19 NOTE — PROGRESS NOTES
Seen as  patient for diabetes    Interim:    He is using sensor and in automode  He has 780 G with G4    He was on steroids for 2 weeks, did temporary basal    Lows less with activity  Hard to correct a low, takes candies    Diagnosed with Type 1 diabetes mellitus in  1985    Known diabetic complications: retinopathy ( macular edema)he has injections Sees Dr. Mckenna  Uncontrolled, moderate    Current diabetic medications     He is on medtronic pump-  On it for 14-16 years      MN  1.20  3:30 1.5  7:30 0.750  13:30 0.550    I:C  MN 13  11 am    15  5 pm 9.5    CF 60    Target     He has had replacement pump as had failures    Last A1c 6.6%<----7.2%<------7%<---7.1%<-----6.8%<---- 6.8%<-- 7.1%<----- 6.9%<----  7.1%<----7.1%<----- 7.3%<--- 8%<---- 8.5%<---- 9.3%    Prior visit with dietician: Yes   Current diet: on average, 3 meals per day   Current exercise: walking   Current monitoring regimen: home blood tests -   Using CGM    Has brought blood glucose log/meter:   Home blood sugar records:    CGM  80 % in range  Unable to download pump  Reviewed manually    Any episodes of hypoglycemia?   Worsened by high CHO    No Hx of CAD , PVD, CVA    Hyperlipidemia:   LDL 79 on 3.21    On lipitor for prevention    LDL 68 on 2/24    Last eye exam: 5/23  Last foot exam: 2/24  Last microalbumin to creatinine ratio: 6/23    He is on vasotec 20mg for prevention    Labs by PCP 3/21    No past medical history on file.  No past surgical history on file.  Current Outpatient Medications   Medication Sig Dispense Refill    NOVOLOG 100 UNIT/ML injection vial 50 UNITS DAILY VIA PUMP 50 mL 3    vitamin D (CHOLECALCIFEROL) 25 MCG (1000 UT) TABS tablet Take 1 tablet by mouth daily      Ascorbic Acid 500 MG CHEW Take 500 mg by mouth daily      Multiple Vitamins-Minerals (MULTIVITAMIN-MINERALS) TABS tablet Take 1 tablet by mouth daily      enalapril (VASOTEC) 20 MG tablet TAKE 1 TABLET BY MOUTH EVERY DAY      atorvastatin (LIPITOR) 10 MG

## 2024-03-29 ENCOUNTER — TELEPHONE (OUTPATIENT)
Dept: ENDOCRINOLOGY | Age: 49
End: 2024-03-29

## 2024-06-11 ENCOUNTER — OFFICE VISIT (OUTPATIENT)
Dept: ENDOCRINOLOGY | Age: 49
End: 2024-06-11
Payer: COMMERCIAL

## 2024-06-11 VITALS
SYSTOLIC BLOOD PRESSURE: 135 MMHG | HEIGHT: 73 IN | BODY MASS INDEX: 22.53 KG/M2 | RESPIRATION RATE: 15 BRPM | WEIGHT: 170 LBS | DIASTOLIC BLOOD PRESSURE: 83 MMHG

## 2024-06-11 DIAGNOSIS — E10.8 DIABETES MELLITUS TYPE 1, CONTROLLED, WITH COMPLICATIONS (HCC): Primary | ICD-10-CM

## 2024-06-11 LAB — HBA1C MFR BLD: 6.6 %

## 2024-06-11 PROCEDURE — 83036 HEMOGLOBIN GLYCOSYLATED A1C: CPT | Performed by: INTERNAL MEDICINE

## 2024-06-11 PROCEDURE — 3044F HG A1C LEVEL LT 7.0%: CPT | Performed by: INTERNAL MEDICINE

## 2024-06-11 PROCEDURE — 3075F SYST BP GE 130 - 139MM HG: CPT | Performed by: INTERNAL MEDICINE

## 2024-06-11 PROCEDURE — 99214 OFFICE O/P EST MOD 30 MIN: CPT | Performed by: INTERNAL MEDICINE

## 2024-06-11 PROCEDURE — 3079F DIAST BP 80-89 MM HG: CPT | Performed by: INTERNAL MEDICINE

## 2024-06-11 RX ORDER — INSULIN ASPART 100 [IU]/ML
INJECTION, SOLUTION INTRAVENOUS; SUBCUTANEOUS
Qty: 50 ML | Refills: 4 | Status: SHIPPED | OUTPATIENT
Start: 2024-06-11

## 2024-06-11 NOTE — PROGRESS NOTES
Seen as  patient for diabetes    Interim:    He is using sensor and in automode  He has 780 G with G4  Right eye amaurosis Fugax    He was on steroids for 2 weeks, did temporary basal    Lows less with activity  Hard to correct a low, takes candies    Diagnosed with Type 1 diabetes mellitus in  1985    Known diabetic complications: retinopathy ( macular edema)he has injections Sees Dr. Mckenna  Uncontrolled, moderate    Current diabetic medications     He is on medtronic pump-  On it for 14-16 years      MN  1.20  3:30 1.5  7:30 0.750  13:30 0.550    I:C  MN 13  11 am    15  5 pm 9.5    CF 60    Target     He has had replacement pump as had failures    Last A1c 6.6%<----6.6%<----7.2%<------7%<---7.1%<-----6.8%<---- 6.8%<-- 7.1%<----- 6.9%<----  7.1%<----7.1%<----- 7.3%<--- 8%<---- 8.5%<---- 9.3%    Prior visit with dietician: Yes   Current diet: on average, 3 meals per day   Current exercise: walking   Current monitoring regimen: home blood tests -   Using CGM    Has brought blood glucose log/meter:   Home blood sugar records:    CGM  66 % in range  Unable to download pump  Reviewed manually    Any episodes of hypoglycemia?   Worsened by high CHO    No Hx of CAD , PVD, CVA    Hyperlipidemia:   LDL 79 on 3.21    On lipitor for prevention    LDL 68 on 2/24    Last eye exam:  3/24  Last foot exam: 2/24  Last microalbumin to creatinine ratio: 6/23    He is on vasotec 20mg for prevention    Labs by PCP 3/21    No past medical history on file.  No past surgical history on file.  Current Outpatient Medications   Medication Sig Dispense Refill    NOVOLOG 100 UNIT/ML injection vial 50 UNITS DAILY VIA PUMP 50 mL 3    vitamin D (CHOLECALCIFEROL) 25 MCG (1000 UT) TABS tablet Take 1 tablet by mouth daily      Ascorbic Acid 500 MG CHEW Take 500 mg by mouth daily      Multiple Vitamins-Minerals (MULTIVITAMIN-MINERALS) TABS tablet Take 1 tablet by mouth daily      enalapril (VASOTEC) 20 MG tablet TAKE 1 TABLET BY MOUTH EVERY

## 2024-11-05 ENCOUNTER — OFFICE VISIT (OUTPATIENT)
Dept: ENDOCRINOLOGY | Age: 49
End: 2024-11-05
Payer: COMMERCIAL

## 2024-11-05 VITALS
HEART RATE: 70 BPM | DIASTOLIC BLOOD PRESSURE: 77 MMHG | SYSTOLIC BLOOD PRESSURE: 130 MMHG | BODY MASS INDEX: 22.43 KG/M2 | WEIGHT: 170 LBS | RESPIRATION RATE: 15 BRPM | OXYGEN SATURATION: 97 %

## 2024-11-05 DIAGNOSIS — E10.65 UNCONTROLLED TYPE 1 DIABETES MELLITUS WITH HYPERGLYCEMIA (HCC): ICD-10-CM

## 2024-11-05 DIAGNOSIS — Z96.41 INSULIN PUMP IN PLACE: Primary | ICD-10-CM

## 2024-11-05 LAB — HBA1C MFR BLD: 7.5 %

## 2024-11-05 PROCEDURE — 3051F HG A1C>EQUAL 7.0%<8.0%: CPT | Performed by: INTERNAL MEDICINE

## 2024-11-05 PROCEDURE — 3075F SYST BP GE 130 - 139MM HG: CPT | Performed by: INTERNAL MEDICINE

## 2024-11-05 PROCEDURE — 99214 OFFICE O/P EST MOD 30 MIN: CPT | Performed by: INTERNAL MEDICINE

## 2024-11-05 PROCEDURE — 95251 CONT GLUC MNTR ANALYSIS I&R: CPT | Performed by: INTERNAL MEDICINE

## 2024-11-05 PROCEDURE — 83036 HEMOGLOBIN GLYCOSYLATED A1C: CPT | Performed by: INTERNAL MEDICINE

## 2024-11-05 PROCEDURE — 3078F DIAST BP <80 MM HG: CPT | Performed by: INTERNAL MEDICINE

## 2024-11-05 NOTE — PROGRESS NOTES
Seen as  patient for diabetes    Interim:    He is using sensor and in automode  He has 780 G with G4  Sensor issues  Transmitter is > 1 year due    He was on steroids for 2 weeks, did temporary basal    Lows less with activity  Hard to correct a low, takes candies    Diagnosed with Type 1 diabetes mellitus in  1985    Known diabetic complications: retinopathy ( macular edema)he has injections Sees Dr. Mckenna  Uncontrolled, moderate    Current diabetic medications     He is on medtronic pump-  On it for 14-16 years      MN  1.20  3:30 1.5  7:30 0.750  13:30 0.550    I:C  MN 13  11 am    15  5 pm 9.5    CF 60    Target     He has had replacement pump as had failures    Last A1c 7.5%<----- 6.6%<----6.6%<----7.2%<------7%<---7.1%<-----6.8%<---- 6.8%<-- 7.1%<----- 6.9%<----  7.1%<----7.1%<----- 7.3%<--- 8%<---- 8.5%<---- 9.3%    Prior visit with dietician: Yes   Current diet: on average, 3 meals per day   Current exercise: walking   Current monitoring regimen: home blood tests -   Using CGM    Has brought blood glucose log/meter:   Home blood sugar records:    CGM  Last 2 weeks  Average 146  Target 81 %  18 %1 % low    Some post meal highs    Any episodes of hypoglycemia?   Worsened by high CHO    No Hx of CAD , PVD, CVA    Hyperlipidemia:   LDL 79 on 3.21    On lipitor for prevention    LDL 68 on 2/24    Last eye exam:  3/24  Last foot exam: 2/24  Last microalbumin to creatinine ratio: 6/23    He is on vasotec 20mg for prevention    Labs by PCP 3/21    No past medical history on file.  No past surgical history on file.  Current Outpatient Medications   Medication Sig Dispense Refill    NOVOLOG 100 UNIT/ML injection vial 50 UNITS DAILY VIA PUMP 50 mL 4    vitamin D (CHOLECALCIFEROL) 25 MCG (1000 UT) TABS tablet Take 1 tablet by mouth daily      Ascorbic Acid 500 MG CHEW Take 500 mg by mouth daily      Multiple Vitamins-Minerals (MULTIVITAMIN-MINERALS) TABS tablet Take 1 tablet by mouth daily      enalapril

## 2024-11-08 LAB
CREATININE URINE: 19.4 MG/DL
ESTIMATED AVERAGE GLUCOSE: 166 MG/DL
FRUCTOSAMINE: 349 MCMOL/L (ref 205–285)
HBA1C MFR BLD: 7.4 % (ref 4.2–5.6)
MICROALBUMIN/CREAT 24H UR: <12 MG/L
MICROALBUMIN/CREAT UR-RTO: NORMAL MG/G CREAT

## 2024-11-15 DIAGNOSIS — E10.65 UNCONTROLLED TYPE 1 DIABETES MELLITUS WITH HYPERGLYCEMIA (HCC): Primary | ICD-10-CM

## 2024-11-18 RX ORDER — INSULIN ASPART 100 [IU]/ML
INJECTION, SOLUTION INTRAVENOUS; SUBCUTANEOUS
Qty: 50 ML | Refills: 1 | Status: SHIPPED | OUTPATIENT
Start: 2024-11-18

## 2025-03-11 ENCOUNTER — OFFICE VISIT (OUTPATIENT)
Dept: ENDOCRINOLOGY | Age: 50
End: 2025-03-11
Payer: COMMERCIAL

## 2025-03-11 VITALS
OXYGEN SATURATION: 96 % | SYSTOLIC BLOOD PRESSURE: 131 MMHG | WEIGHT: 167 LBS | BODY MASS INDEX: 22.03 KG/M2 | HEART RATE: 84 BPM | DIASTOLIC BLOOD PRESSURE: 80 MMHG

## 2025-03-11 DIAGNOSIS — E10.8 DIABETES MELLITUS TYPE 1, CONTROLLED, WITH COMPLICATIONS (HCC): Primary | ICD-10-CM

## 2025-03-11 DIAGNOSIS — Z96.41 INSULIN PUMP IN PLACE: ICD-10-CM

## 2025-03-11 LAB — HBA1C MFR BLD: 7 %

## 2025-03-11 PROCEDURE — 99214 OFFICE O/P EST MOD 30 MIN: CPT | Performed by: INTERNAL MEDICINE

## 2025-03-11 PROCEDURE — 3075F SYST BP GE 130 - 139MM HG: CPT | Performed by: INTERNAL MEDICINE

## 2025-03-11 PROCEDURE — 3051F HG A1C>EQUAL 7.0%<8.0%: CPT | Performed by: INTERNAL MEDICINE

## 2025-03-11 PROCEDURE — 95251 CONT GLUC MNTR ANALYSIS I&R: CPT | Performed by: INTERNAL MEDICINE

## 2025-03-11 PROCEDURE — 83036 HEMOGLOBIN GLYCOSYLATED A1C: CPT | Performed by: INTERNAL MEDICINE

## 2025-03-11 PROCEDURE — 3079F DIAST BP 80-89 MM HG: CPT | Performed by: INTERNAL MEDICINE

## 2025-03-11 NOTE — PROGRESS NOTES
enalapril (VASOTEC) 20 MG tablet TAKE 1 TABLET BY MOUTH EVERY DAY      atorvastatin (LIPITOR) 10 MG tablet        No current facility-administered medications for this visit.     Current Outpatient Medications   Medication Sig Dispense Refill    NOVOLOG 100 UNIT/ML injection vial 50 UNITS DAILY VIA PUMP 50 mL 1    vitamin D (CHOLECALCIFEROL) 25 MCG (1000 UT) TABS tablet Take 1 tablet by mouth daily      Ascorbic Acid 500 MG CHEW Take 500 mg by mouth daily      Multiple Vitamins-Minerals (MULTIVITAMIN-MINERALS) TABS tablet Take 1 tablet by mouth daily      enalapril (VASOTEC) 20 MG tablet TAKE 1 TABLET BY MOUTH EVERY DAY      atorvastatin (LIPITOR) 10 MG tablet        No current facility-administered medications for this visit.       SH: former smoker    Review of Systems  Please see scanned document dated and signed      Objective:     Vitals:    03/11/25 1633   BP: 131/80   Pulse: 84   SpO2: 96%         Constitutional: Well-developed, appears stated age, cooperative, in no acute distress  H/E/N/M/T:atraumatic, normocephalic, external ears, nose, lips normal without lesions  Eyes: Lids, lashes, conjunctivae and sclerae normal, No proptosis, no redness  Neck: supple, symmetrical, no swelling  Skin: No obvious rashes or lesions present.  Skin and hair texture normal  Psychiatric: Judgement and Insight:  judgement and insight appear normal  Neuro: Normal without focal findings, speech is normal normal, speech is spontaneous  Chest: No labored breathing, no chest deformity, no stridor  Musculoskeletal: No joint deformity, swelling    2/24 monofilament detected  No ulcers    Lab Reviewed   No components found for: \"CHLPL\"  Lab Results   Component Value Date    TRIG 54 02/03/2024     Lab Results   Component Value Date    HDL 64 02/03/2024     No components found for: \"LDLCALC\"    No components found for: \"LABVLDL\"  Lab Results   Component Value Date    LABA1C 7.4 (H) 11/08/2024       Assessment:     Saulo Krishnamurthy is a

## 2025-07-29 ENCOUNTER — OFFICE VISIT (OUTPATIENT)
Dept: ENDOCRINOLOGY | Age: 50
End: 2025-07-29
Payer: COMMERCIAL

## 2025-07-29 VITALS
TEMPERATURE: 76 F | BODY MASS INDEX: 22.3 KG/M2 | DIASTOLIC BLOOD PRESSURE: 74 MMHG | SYSTOLIC BLOOD PRESSURE: 121 MMHG | OXYGEN SATURATION: 96 % | WEIGHT: 169 LBS

## 2025-07-29 DIAGNOSIS — Z96.41 INSULIN PUMP IN PLACE: ICD-10-CM

## 2025-07-29 DIAGNOSIS — E10.8 DIABETES MELLITUS TYPE 1, CONTROLLED, WITH COMPLICATIONS (HCC): Primary | ICD-10-CM

## 2025-07-29 LAB — HBA1C MFR BLD: 6.7 %

## 2025-07-29 PROCEDURE — 83036 HEMOGLOBIN GLYCOSYLATED A1C: CPT | Performed by: INTERNAL MEDICINE

## 2025-07-29 PROCEDURE — 3078F DIAST BP <80 MM HG: CPT | Performed by: INTERNAL MEDICINE

## 2025-07-29 PROCEDURE — 95251 CONT GLUC MNTR ANALYSIS I&R: CPT | Performed by: INTERNAL MEDICINE

## 2025-07-29 PROCEDURE — 3074F SYST BP LT 130 MM HG: CPT | Performed by: INTERNAL MEDICINE

## 2025-07-29 PROCEDURE — 3044F HG A1C LEVEL LT 7.0%: CPT | Performed by: INTERNAL MEDICINE

## 2025-07-29 PROCEDURE — 99214 OFFICE O/P EST MOD 30 MIN: CPT | Performed by: INTERNAL MEDICINE

## 2025-07-29 NOTE — PROGRESS NOTES
Seen as  patient for diabetes    Interim:    He is using sensor and in automode  He has 780 G with G4  New transmitter issue helped    He was on steroids for 2 weeks, did temporary basal    Lows less with activity  Hard to correct a low, takes candies    Diagnosed with Type 1 diabetes mellitus in  1985    Known diabetic complications: retinopathy ( macular edema)he has injections Sees Dr. Mckenna  Uncontrolled, moderate    Current diabetic medications     He is on medtronic pump-  On it for 14-16 years      MN  1.20  3:30 1.5  7:30 0.750  13:30 0.550      I:C  MN 12  11 am   15  5 pm 8.5    CF 60    Target     He has had replacement pump as had failures    Last A1c 6.7%<---7%<---7.5%<----- 6.6%<----6.6%<----7.2%<------7%<---7.1%<-----6.8%<---- 6.8%<-- 7.1%<----- 6.9%<----  7.1%<----7.1%<----- 7.3%<--- 8%<---- 8.5%<---- 9.3%    Prior visit with dietician: Yes   Current diet: on average, 3 meals per day   Current exercise: walking   Current monitoring regimen: home blood tests -   Using CGM    Has brought blood glucose log/meter:   Home blood sugar records:    CGM  Last 2 weeks  Average 146  Target 81%  19 % 0 % low    Some post meal highs    Any episodes of hypoglycemia?   Worsened by high CHO    No Hx of CAD , PVD, CVA    Hyperlipidemia:   LDL 79 on 3.21    On lipitor for prevention    LDL 68 on 2/24    Last eye exam:  4/25  Last foot exam: 2/24  Last microalbumin to creatinine ratio: 11/24    He is on vasotec 20mg for prevention    Labs by PCP 3/21    No past medical history on file.  No past surgical history on file.  Current Outpatient Medications   Medication Sig Dispense Refill    NOVOLOG 100 UNIT/ML injection vial 50 UNITS DAILY VIA PUMP 50 mL 1    vitamin D (CHOLECALCIFEROL) 25 MCG (1000 UT) TABS tablet Take 1 tablet by mouth daily      Ascorbic Acid 500 MG CHEW Take 500 mg by mouth daily      Multiple Vitamins-Minerals (MULTIVITAMIN-MINERALS) TABS tablet Take 1 tablet by mouth daily      enalapril